# Patient Record
Sex: MALE | Race: AMERICAN INDIAN OR ALASKA NATIVE
[De-identification: names, ages, dates, MRNs, and addresses within clinical notes are randomized per-mention and may not be internally consistent; named-entity substitution may affect disease eponyms.]

---

## 2019-04-30 ENCOUNTER — HOSPITAL ENCOUNTER (EMERGENCY)
Dept: HOSPITAL 42 - ED | Age: 29
Discharge: HOME | End: 2019-04-30
Payer: COMMERCIAL

## 2019-04-30 VITALS
RESPIRATION RATE: 16 BRPM | HEART RATE: 74 BPM | OXYGEN SATURATION: 99 % | DIASTOLIC BLOOD PRESSURE: 72 MMHG | SYSTOLIC BLOOD PRESSURE: 117 MMHG

## 2019-04-30 VITALS — BODY MASS INDEX: 33.7 KG/M2

## 2019-04-30 VITALS — TEMPERATURE: 98.4 F

## 2019-04-30 DIAGNOSIS — R05: Primary | ICD-10-CM

## 2019-04-30 DIAGNOSIS — Z87.891: ICD-10-CM

## 2019-04-30 NOTE — RAD
Date of service: 



04/30/2019



HISTORY:

 cough x 3 weeks 



COMPARISON:

No prior.



TECHNIQUE:

Chest PA and lateral views



FINDINGS:



LUNGS:

No active pulmonary disease.



PLEURA:

No significant pleural effusion identified. No pneumothorax apparent.



CARDIOVASCULAR:

No aortic atherosclerotic calcification present.



Normal cardiac size. No pulmonary vascular congestion. 



OSSEOUS STRUCTURES:

No significant abnormalities.



VISUALIZED UPPER ABDOMEN:

Normal.



OTHER FINDINGS:

None.



IMPRESSION:

No active disease.

## 2019-04-30 NOTE — ED PDOC
Arrival/HPI





- General


Chief Complaint: Cough, Cold, Congestion


Time Seen by Provider: 04/30/19 09:58


Historian: Patient





- History of Present Illness


Narrative History of Present Illness (Text): 





04/30/19 11:16


28yr old male with a history of asthma presents today with a 3-week history of 

cough.  Patient denies shortness of breath.  Patient states he has been having a

productive cough that has worsened over the past 3 weeks.  Patient states he is 

getting chest pain only with cough.  He denies dizziness or weakness.  No 

headaches.  Denies vomiting or diarrhea.  No abdominal pain.  Patient states the

cough is with yellow sputum.  No medications have been taken at home.  Patient 

states he has a prior history of asthma which developed after he quit smoking.  

Patient states he recently started smoking 3 months ago.  Patient denies fevers 

or chills.  He denies sick contacts. Pt is complaining of nasal congestion. No 

other complaints. 





Past Medical History





- Provider Review


Nursing Documentation Reviewed: Yes





- Travel History


Have you recently traveled outside [place] w/in the past 3 mons?: No





- Psychiatric


Hx Substance Use: No





Family/Social History





- Physician Review


Nursing Documentation Reviewed: Yes


Family/Social History: Unknown Family HX


Smoking Status: Never Smoked


Hx Alcohol Use: No


Hx Substance Use: No





Allergies/Home Meds


Allergies/Adverse Reactions: 


Allergies





No Known Allergies Allergy (Verified 04/30/19 10:05)


   











Review of Systems





- Review of Systems


Constitutional: absent: Fatigue, Fevers


ENT: Sinus Congestion.  absent: Sore Throat


Respiratory: Cough, Sputum, Wheezing.  absent: SOB


Cardiovascular: Chest Pain (only with cough).  absent: Palpitations


Gastrointestinal: absent: Abdominal Pain, Nausea, Vomiting


Genitourinary Male: absent: Dysuria


Musculoskeletal: absent: Arthralgias, Back Pain, Neck Pain


Skin: absent: Rash


Neurological: absent: Headache, Dizziness


Psychiatric: absent: Anxiety, Depression





Physical Exam


Vital Signs Reviewed: Yes





Vital Signs











  Temp Pulse Resp BP Pulse Ox


 


 04/30/19 09:57  98.4 F  76  18  115/70  97











Temperature: Afebrile


Blood Pressure: Normal


Pulse: Regular


Respiratory Rate: Normal


Appearance: Positive for: Well-Appearing, Non-Toxic, Comfortable


Pain Distress: None


Mental Status: Positive for: Alert and Oriented X 3





- Systems Exam


Head: Present: Atraumatic


Ears: Present: Normal


Mouth: Present: Moist Mucous Membranes, Normal Lips, Normal Tounge


Pharnyx: Present: Normal.  No: ERYTHEMA, Peritonsilar Swelling, Uvular Deviation


Nose (External): Present: Atraumatic


Nose (Internal): Present: Clear Mucous


Neck: Present: Normal Range of Motion, Trachea Midline


Respiratory/Chest: Present: Clear to Auscultation, Good Air Exchange.  No: 

Respiratory Distress, Accessory Muscle Use, Wheezes, Retracting, Rhonchi, 

Tachypneic


Cardiovascular: Present: Regular Rate and Rhythm, Normal S1, S2.  No: Murmurs


Abdomen: No: Tenderness





Medical Decision Making


ED Course and Treatment: 





04/30/19 11:25


Patient is nontoxic well-appearing in no distress. Vital signs are stable. 








albuterol neb given; 


tylenol PO 


predisone





cxr; wnl








Patient is nontoxic well-appearing in no distress resting comfortably








Zithromax





Patient reassessment: Patient is feeling better after medications.  Vital signs 

are stable.  Lungs are clear to auscultation.








I advised follow up with primary care physician within the next 2 days. I 

advised increase fluids and return if symptoms worsen persist or if new symptoms

develop.  Advised taking medications as prescribed. 








Patient verbalizes understanding of discharge instructions and need for 

immediate followup.








All aspects of this case were discussed the attending of record.








IMPRESSION; cough


Motrin one tablet every 6 hours as needed for pain


Zithromax one tablet once daily x4 days


Prednisone daily x4 days


Albuterol nebulizer use 3 times daily as needed for cough 


Increase fluids


Followup with primary care physician the next 2 days


Return if symptoms worsen persist or if new symptoms develop





Reassessment Condition: Re-examined, Improved





- RAD Interpretation


Radiology Orders: 











04/30/19 10:34


CHEST TWO VIEWS (PA/LAT) [RAD] Stat 














- Medication Orders


Current Medication Orders: 














Discontinued Medications





Acetaminophen (Tylenol 325mg Tab)  975 mg PO STAT STA


   Stop: 04/30/19 10:35


   Last Admin: 04/30/19 10:41  Dose: 975 mg





MAR Pain/Vitals


 Document     04/30/19 10:41  MA  (Rec: 04/30/19 10:41  MA  [id number])


     Pain Reassessment


      Is This A Pain ReAssessment?               Yes


     Sleep


      Is patient sleeping during reassessment?   No


     Presence of Pain


      Presence of Pain                           Yes


     Pain Scale Used


     Protocol:  PSCALES


      Pain Scale Used                            Numeric


     Location


      Pain Location Body Site                    Chest


      Intensity                                  2


      Scale Used                                 Numeric





Albuterol Sulfate (Albuterol 0.083% Inhal Sol (2.5 Mg/3 Ml) Ud)  2.5 mg IH STAT 

STA


   Stop: 04/30/19 10:34


   Last Admin: 04/30/19 10:41  Dose: 2.5 mg











Disposition/Present on Arrival





- Present on Arrival


Any Indicators Present on Arrival: No


History of DVT/PE: No


History of Uncontrolled Diabetes: No


Urinary Catheter: No


History of Decub. Ulcer: No


History Surgical Site Infection Following: None





- Disposition


Have Diagnosis and Disposition been Completed?: Yes


Diagnosis: 


 Cough





Disposition: HOME/ ROUTINE


Disposition Time: 11:47


Patient Plan: Discharge


Condition: GOOD


Discharge Instructions (ExitCare):  Cough, Adult (DC)


Additional Instructions: 


Motrin one tablet every 6 hours as needed for pain


Zithromax one tablet once daily x4 days


Prednisone daily x4 days


Albuterol nebulizer use 3 times daily as needed for cough 


albuterol inhaler every 4-6 hours as needed for cough. 


Increase fluids


Followup with primary care physician the next 2 days


Return if symptoms worsen persist or if new symptoms develop





Prescriptions: 


Albuterol HFA [Ventolin HFA 90 mcg/actuation (8 g)] 2 puff IH [id number] PRN #1 

inhaler


 PRN Reason: Cough


Albuterol 0.083% [Albuterol 0.083% Inhal Sol (2.5 mg/3 ml) UD] 1 vial IH TID PRN

#1 packet


 PRN Reason: Cough


Azithromycin [Zithromax] 250 mg PO DAILY #4 tab


Ibuprofen [Motrin] 600 mg PO Q6H PRN #20 tab


 PRN Reason: pain/fever reduction


Nebulizer [Compact Compressor Nebulizer] 1 dev XX PRN PRN #1 dev


 PRN Reason: Cough


predniSONE [predniSONE Tab] 2 tab PO DAILY #8 tab


Referrals: 


Ana Donald MD [Medical Doctor] - Follow up with primary


Francisco J Hankins MD [Staff Provider] - Follow up with primary


 Service [Outside] - Follow up with primary


Forms:  [employer] (English), WORK NOTE